# Patient Record
Sex: MALE | Race: WHITE | Employment: FULL TIME | URBAN - METROPOLITAN AREA
[De-identification: names, ages, dates, MRNs, and addresses within clinical notes are randomized per-mention and may not be internally consistent; named-entity substitution may affect disease eponyms.]

---

## 2017-01-23 ENCOUNTER — APPOINTMENT (OUTPATIENT)
Dept: ULTRASOUND IMAGING | Facility: HOSPITAL | Age: 47
End: 2017-01-23
Attending: EMERGENCY MEDICINE
Payer: COMMERCIAL

## 2017-01-23 ENCOUNTER — HOSPITAL ENCOUNTER (EMERGENCY)
Facility: HOSPITAL | Age: 47
Discharge: HOME | End: 2017-01-23
Attending: EMERGENCY MEDICINE
Payer: COMMERCIAL

## 2017-01-23 ENCOUNTER — APPOINTMENT (OUTPATIENT)
Dept: GENERAL RADIOLOGY | Facility: HOSPITAL | Age: 47
End: 2017-01-23
Attending: EMERGENCY MEDICINE
Payer: COMMERCIAL

## 2017-01-23 VITALS
RESPIRATION RATE: 21 BRPM | DIASTOLIC BLOOD PRESSURE: 72 MMHG | BODY MASS INDEX: 45.1 KG/M2 | HEIGHT: 70 IN | SYSTOLIC BLOOD PRESSURE: 130 MMHG | WEIGHT: 315 LBS | OXYGEN SATURATION: 98 % | TEMPERATURE: 98 F | HEART RATE: 60 BPM

## 2017-01-23 DIAGNOSIS — M79.662 PAIN OF LEFT CALF: ICD-10-CM

## 2017-01-23 DIAGNOSIS — S86.912A KNEE STRAIN, LEFT, INITIAL ENCOUNTER: Primary | ICD-10-CM

## 2017-01-23 PROCEDURE — 73560 X-RAY EXAM OF KNEE 1 OR 2: CPT

## 2017-01-23 PROCEDURE — 99284 EMERGENCY DEPT VISIT MOD MDM: CPT

## 2017-01-23 PROCEDURE — 93971 EXTREMITY STUDY: CPT

## 2017-01-23 RX ORDER — IBUPROFEN 600 MG/1
600 TABLET ORAL ONCE
Status: COMPLETED | OUTPATIENT
Start: 2017-01-23 | End: 2017-01-23

## 2017-01-23 NOTE — ED PROVIDER NOTES
Patient Seen in: [facility] Emergency Department    History   Patient presents with:  Lower Extremity Injury (musculoskeletal)    Stated Complaint: left knee injury    HPI    This is a 44-year-old male who arrives here with complaints of left knee i MANAGEMENT       Medications :  Not on File       Family History   Problem Relation Age of Onset   • Hypertension Father    • Cancer Father    • Other Father    • Lipids Mother          Smoking Status: Never Smoker                      Smokeless Status: Ne effusion. The patient was given Motrin for pain I discussed this case with him he says he has an Ace bandage and a knee immobilizer at home and crutches at home.   I discussed with her there is possibly that there is an internal derangement but I discuss

## 2017-01-24 NOTE — ED INITIAL ASSESSMENT (HPI)
Pt was in [facility] on Friday slipped on ice and landed on left knee. Pt states he waited to get back home to seek treatment. Swelling noted to left knee at this time. Pt states the pain is 2/10 when stationary, 4/10 when walking, and 6/10 with bending.

## 2017-02-01 ENCOUNTER — PRIOR ORIGINAL RECORDS (OUTPATIENT)
Dept: OTHER | Age: 47
End: 2017-02-01

## 2017-02-01 ENCOUNTER — MYAURORA ACCOUNT LINK (OUTPATIENT)
Dept: OTHER | Age: 47
End: 2017-02-01

## 2017-02-09 ENCOUNTER — LAB ENCOUNTER (OUTPATIENT)
Dept: LAB | Age: 47
End: 2017-02-09
Attending: INTERNAL MEDICINE
Payer: COMMERCIAL

## 2017-02-09 ENCOUNTER — PRIOR ORIGINAL RECORDS (OUTPATIENT)
Dept: OTHER | Age: 47
End: 2017-02-09

## 2017-02-09 DIAGNOSIS — E66.9 OBESITY, UNSPECIFIED: ICD-10-CM

## 2017-02-09 DIAGNOSIS — I51.7 CARDIOMEGALY: Primary | ICD-10-CM

## 2017-02-09 LAB
BASOPHILS # BLD AUTO: 0.06 X10(3) UL (ref 0–0.1)
BASOPHILS NFR BLD AUTO: 0.6 %
EOSINOPHIL # BLD AUTO: 0.34 X10(3) UL (ref 0–0.3)
EOSINOPHIL NFR BLD AUTO: 3.5 %
ERYTHROCYTE [DISTWIDTH] IN BLOOD BY AUTOMATED COUNT: 13.2 % (ref 11.5–16)
HCT VFR BLD AUTO: 45.2 % (ref 37–53)
HGB BLD-MCNC: 15 G/DL (ref 13–17)
IMMATURE GRANULOCYTE COUNT: 0.03 X10(3) UL (ref 0–1)
IMMATURE GRANULOCYTE RATIO %: 0.3 %
LYMPHOCYTES # BLD AUTO: 3.97 X10(3) UL (ref 0.9–4)
LYMPHOCYTES NFR BLD AUTO: 40.4 %
MCH RBC QN AUTO: 30.4 PG (ref 27–33.2)
MCHC RBC AUTO-ENTMCNC: 33.2 G/DL (ref 31–37)
MCV RBC AUTO: 91.5 FL (ref 80–99)
MONOCYTES # BLD AUTO: 0.85 X10(3) UL (ref 0.1–0.6)
MONOCYTES NFR BLD AUTO: 8.7 %
NEUTROPHIL ABS PRELIM: 4.57 X10 (3) UL (ref 1.3–6.7)
NEUTROPHILS # BLD AUTO: 4.57 X10(3) UL (ref 1.3–6.7)
NEUTROPHILS NFR BLD AUTO: 46.5 %
PLATELET # BLD AUTO: 278 10(3)UL (ref 150–450)
RBC # BLD AUTO: 4.94 X10(6)UL (ref 4.3–5.7)
RED CELL DISTRIBUTION WIDTH-SD: 44.2 FL (ref 35.1–46.3)
WBC # BLD AUTO: 9.8 X10(3) UL (ref 4–13)

## 2017-02-09 PROCEDURE — 36415 COLL VENOUS BLD VENIPUNCTURE: CPT | Performed by: FAMILY MEDICINE

## 2017-02-09 PROCEDURE — 80053 COMPREHEN METABOLIC PANEL: CPT | Performed by: FAMILY MEDICINE

## 2017-02-09 PROCEDURE — 85025 COMPLETE CBC W/AUTO DIFF WBC: CPT

## 2017-02-09 PROCEDURE — 84443 ASSAY THYROID STIM HORMONE: CPT | Performed by: FAMILY MEDICINE

## 2017-02-09 PROCEDURE — 84439 ASSAY OF FREE THYROXINE: CPT | Performed by: FAMILY MEDICINE

## 2017-02-09 PROCEDURE — 84153 ASSAY OF PSA TOTAL: CPT | Performed by: FAMILY MEDICINE

## 2017-02-09 PROCEDURE — 80061 LIPID PANEL: CPT | Performed by: FAMILY MEDICINE

## 2017-02-10 LAB
HEMATOCRIT: 45.2 %
HEMOGLOBIN: 15 G/DL
PLATELETS: 278 K/UL
RED BLOOD COUNT: 4.94 X 10-6/U
WHITE BLOOD COUNT: 9.8 X 10-3/U

## 2017-03-23 ENCOUNTER — MYAURORA ACCOUNT LINK (OUTPATIENT)
Dept: OTHER | Age: 47
End: 2017-03-23

## 2017-04-08 ENCOUNTER — MYAURORA ACCOUNT LINK (OUTPATIENT)
Dept: OTHER | Age: 47
End: 2017-04-08

## 2017-04-08 ENCOUNTER — HOSPITAL ENCOUNTER (OUTPATIENT)
Dept: CV DIAGNOSTICS | Facility: HOSPITAL | Age: 47
Discharge: HOME | End: 2017-04-08
Attending: INTERNAL MEDICINE

## 2017-04-08 DIAGNOSIS — E66.9 OBESITY, UNSPECIFIED: ICD-10-CM

## 2017-04-08 DIAGNOSIS — I51.7 CARDIOMEGALY: ICD-10-CM

## 2017-04-14 ENCOUNTER — PRIOR ORIGINAL RECORDS (OUTPATIENT)
Dept: OTHER | Age: 47
End: 2017-04-14

## 2017-04-19 ENCOUNTER — PRIOR ORIGINAL RECORDS (OUTPATIENT)
Dept: OTHER | Age: 47
End: 2017-04-19

## 2018-02-03 ENCOUNTER — OFFICE VISIT (OUTPATIENT)
Dept: SLEEP CENTER | Facility: HOSPITAL | Age: 48
End: 2018-02-03
Attending: INTERNAL MEDICINE
Payer: COMMERCIAL

## 2018-02-03 PROCEDURE — 95811 POLYSOM 6/>YRS CPAP 4/> PARM: CPT

## 2018-02-10 NOTE — PROCEDURES
[address] [address],[facility] [address]       Accredited by the [facility] ([facility])    PATIENT'S NAME:        Ayanna Hendrix  ATTENDING PHYSICIAN:   Gregory Patel M.D.   REFERRING PHYSICIAN:   ROSALIE Moseley sleep onset time was 3 minutes. During the CPAP portion of the study, the total recording time was 264 minutes, the total sleep time 252 minutes for a sleep efficiency of 95%. Sleep onset latency was 22 minutes. Wake after sleep onset time was 3 minutes. medium-size mask with a humidity level of 4 and an EPR level of 3. Close clinical followup is needed to ensure treatment compliance and remission of daytime impairment. The patient should avoid driving or operating machinery during periods of sleepiness.

## 2018-02-13 ENCOUNTER — PRIOR ORIGINAL RECORDS (OUTPATIENT)
Dept: OTHER | Age: 48
End: 2018-02-13

## 2018-08-23 ENCOUNTER — APPOINTMENT (OUTPATIENT)
Dept: GENERAL RADIOLOGY | Facility: HOSPITAL | Age: 48
End: 2018-08-23
Attending: EMERGENCY MEDICINE
Payer: COMMERCIAL

## 2018-08-23 ENCOUNTER — APPOINTMENT (OUTPATIENT)
Dept: CT IMAGING | Facility: HOSPITAL | Age: 48
End: 2018-08-23
Attending: EMERGENCY MEDICINE
Payer: COMMERCIAL

## 2018-08-23 ENCOUNTER — HOSPITAL ENCOUNTER (EMERGENCY)
Facility: HOSPITAL | Age: 48
Discharge: HOME | End: 2018-08-24
Attending: EMERGENCY MEDICINE
Payer: COMMERCIAL

## 2018-08-23 DIAGNOSIS — R20.2 PARESTHESIAS: Primary | ICD-10-CM

## 2018-08-23 LAB
ALBUMIN SERPL-MCNC: 3.8 G/DL (ref 3.5–4.8)
ALBUMIN/GLOB SERPL: 1.1 {RATIO} (ref 1–2)
ALP LIVER SERPL-CCNC: 79 U/L (ref 45–117)
ALT SERPL-CCNC: 43 U/L (ref 17–63)
ANION GAP SERPL CALC-SCNC: 7 MMOL/L (ref 0–18)
APTT PPP: 31.7 SECONDS (ref 26.1–34.6)
AST SERPL-CCNC: 21 U/L (ref 15–41)
BASOPHILS # BLD AUTO: 0.06 X10(3) UL (ref 0–0.1)
BASOPHILS NFR BLD AUTO: 0.5 %
BILIRUB SERPL-MCNC: 0.6 MG/DL (ref 0.1–2)
BUN BLD-MCNC: 13 MG/DL (ref 8–20)
BUN/CREAT SERPL: 13.3 (ref 10–20)
CALCIUM BLD-MCNC: 8.7 MG/DL (ref 8.3–10.3)
CHLORIDE SERPL-SCNC: 107 MMOL/L (ref 101–111)
CO2 SERPL-SCNC: 28 MMOL/L (ref 22–32)
CREAT BLD-MCNC: 0.98 MG/DL (ref 0.7–1.3)
D-DIMER: <0.27 UG/ML FEU (ref 0–0.49)
EOSINOPHIL # BLD AUTO: 0.59 X10(3) UL (ref 0–0.3)
EOSINOPHIL NFR BLD AUTO: 4.9 %
ERYTHROCYTE [DISTWIDTH] IN BLOOD BY AUTOMATED COUNT: 13.2 % (ref 11.5–16)
GLOBULIN PLAS-MCNC: 3.5 G/DL (ref 2.5–4)
GLUCOSE BLD-MCNC: 121 MG/DL (ref 70–99)
HCT VFR BLD AUTO: 40.2 % (ref 37–53)
HGB BLD-MCNC: 14.3 G/DL (ref 13–17)
IMMATURE GRANULOCYTE COUNT: 0.02 X10(3) UL (ref 0–1)
IMMATURE GRANULOCYTE RATIO %: 0.2 %
INR BLD: 1 (ref 0.9–1.1)
LYMPHOCYTES # BLD AUTO: 5.04 X10(3) UL (ref 0.9–4)
LYMPHOCYTES NFR BLD AUTO: 41.9 %
M PROTEIN MFR SERPL ELPH: 7.3 G/DL (ref 6.1–8.3)
MCH RBC QN AUTO: 31 PG (ref 27–33.2)
MCHC RBC AUTO-ENTMCNC: 35.6 G/DL (ref 31–37)
MCV RBC AUTO: 87.2 FL (ref 80–99)
MONOCYTES # BLD AUTO: 1.06 X10(3) UL (ref 0.1–1)
MONOCYTES NFR BLD AUTO: 8.8 %
NEUTROPHIL ABS PRELIM: 5.26 X10 (3) UL (ref 1.3–6.7)
NEUTROPHILS # BLD AUTO: 5.26 X10(3) UL (ref 1.3–6.7)
NEUTROPHILS NFR BLD AUTO: 43.7 %
OSMOLALITY SERPL CALC.SUM OF ELEC: 295 MOSM/KG (ref 275–295)
PLATELET # BLD AUTO: 274 10(3)UL (ref 150–450)
POTASSIUM SERPL-SCNC: 3.4 MMOL/L (ref 3.6–5.1)
PSA SERPL DL<=0.01 NG/ML-MCNC: 13.6 SECONDS (ref 12.4–14.7)
RBC # BLD AUTO: 4.61 X10(6)UL (ref 4.3–5.7)
RED CELL DISTRIBUTION WIDTH-SD: 41.8 FL (ref 35.1–46.3)
SODIUM SERPL-SCNC: 142 MMOL/L (ref 136–144)
TROPONIN I SERPL-MCNC: <0.046 NG/ML (ref ?–0.05)
WBC # BLD AUTO: 12 X10(3) UL (ref 4–13)

## 2018-08-23 PROCEDURE — 93010 ELECTROCARDIOGRAM REPORT: CPT

## 2018-08-23 PROCEDURE — 85025 COMPLETE CBC W/AUTO DIFF WBC: CPT | Performed by: EMERGENCY MEDICINE

## 2018-08-23 PROCEDURE — 85378 FIBRIN DEGRADE SEMIQUANT: CPT | Performed by: EMERGENCY MEDICINE

## 2018-08-23 PROCEDURE — 96374 THER/PROPH/DIAG INJ IV PUSH: CPT

## 2018-08-23 PROCEDURE — 84484 ASSAY OF TROPONIN QUANT: CPT | Performed by: EMERGENCY MEDICINE

## 2018-08-23 PROCEDURE — 70450 CT HEAD/BRAIN W/O DYE: CPT | Performed by: EMERGENCY MEDICINE

## 2018-08-23 PROCEDURE — 85610 PROTHROMBIN TIME: CPT | Performed by: EMERGENCY MEDICINE

## 2018-08-23 PROCEDURE — 99285 EMERGENCY DEPT VISIT HI MDM: CPT

## 2018-08-23 PROCEDURE — 80053 COMPREHEN METABOLIC PANEL: CPT | Performed by: EMERGENCY MEDICINE

## 2018-08-23 PROCEDURE — 71045 X-RAY EXAM CHEST 1 VIEW: CPT | Performed by: EMERGENCY MEDICINE

## 2018-08-23 PROCEDURE — 85730 THROMBOPLASTIN TIME PARTIAL: CPT | Performed by: EMERGENCY MEDICINE

## 2018-08-23 PROCEDURE — 93005 ELECTROCARDIOGRAM TRACING: CPT

## 2018-08-23 RX ORDER — LEVOTHYROXINE SODIUM 0.05 MG/1
50 TABLET ORAL
COMMUNITY
End: 2022-02-01

## 2018-08-24 ENCOUNTER — APPOINTMENT (OUTPATIENT)
Dept: MRI IMAGING | Facility: HOSPITAL | Age: 48
End: 2018-08-24
Attending: EMERGENCY MEDICINE
Payer: COMMERCIAL

## 2018-08-24 VITALS
DIASTOLIC BLOOD PRESSURE: 85 MMHG | RESPIRATION RATE: 18 BRPM | SYSTOLIC BLOOD PRESSURE: 134 MMHG | HEART RATE: 66 BPM | BODY MASS INDEX: 45.1 KG/M2 | OXYGEN SATURATION: 98 % | HEIGHT: 70 IN | TEMPERATURE: 98 F | WEIGHT: 315 LBS

## 2018-08-24 LAB
ATRIAL RATE: 71 BPM
BILIRUB UR QL STRIP.AUTO: NEGATIVE
CLARITY UR REFRACT.AUTO: CLEAR
COLOR UR AUTO: YELLOW
GLUCOSE UR STRIP.AUTO-MCNC: NEGATIVE MG/DL
KETONES UR STRIP.AUTO-MCNC: NEGATIVE MG/DL
LEUKOCYTE ESTERASE UR QL STRIP.AUTO: NEGATIVE
NITRITE UR QL STRIP.AUTO: NEGATIVE
P AXIS: 60 DEGREES
P-R INTERVAL: 188 MS
PH UR STRIP.AUTO: 6 [PH] (ref 4.5–8)
PROT UR STRIP.AUTO-MCNC: NEGATIVE MG/DL
Q-T INTERVAL: 422 MS
QRS DURATION: 102 MS
QTC CALCULATION (BEZET): 458 MS
R AXIS: 40 DEGREES
RBC UR QL AUTO: NEGATIVE
SP GR UR STRIP.AUTO: 1.02 (ref 1–1.03)
T AXIS: 46 DEGREES
UROBILINOGEN UR STRIP.AUTO-MCNC: <2 MG/DL
VENTRICULAR RATE: 71 BPM

## 2018-08-24 PROCEDURE — 81003 URINALYSIS AUTO W/O SCOPE: CPT | Performed by: EMERGENCY MEDICINE

## 2018-08-24 PROCEDURE — A9576 INJ PROHANCE MULTIPACK: HCPCS | Performed by: EMERGENCY MEDICINE

## 2018-08-24 PROCEDURE — 70553 MRI BRAIN STEM W/O & W/DYE: CPT | Performed by: EMERGENCY MEDICINE

## 2018-08-24 RX ORDER — LORAZEPAM 2 MG/ML
1 INJECTION INTRAMUSCULAR ONCE
Status: COMPLETED | OUTPATIENT
Start: 2018-08-24 | End: 2018-08-24

## 2018-08-24 RX ORDER — POTASSIUM CHLORIDE 20 MEQ/1
40 TABLET, EXTENDED RELEASE ORAL ONCE
Status: COMPLETED | OUTPATIENT
Start: 2018-08-24 | End: 2018-08-24

## 2018-08-24 NOTE — ED INITIAL ASSESSMENT (HPI)
11am started experiencing numbness/tingling in his left arm and later in the day started feeling a dull pain between shoulder blades.  As night as gone on the numbness/tingling radiated to left jaw and down left leg

## 2018-08-24 NOTE — ED PROVIDER NOTES
Patient Seen in: [facility] Emergency Department    History   Patient presents with:  Stroke (neurologic)    Stated Complaint: numbness    HPI    Should not presents with left-sided numbness.   The patient states that the numbness started about 11 AM 5+ YR N/A      Comment: Procedure: LUMBAR EPIDURAL;  Surgeon:                Liz Fragoso MD;  Location: [facility] FOR               PAIN MANAGEMENT        Smoking status: Never Smoker                                                              Smok Lymphocyte Absolute 5.04 (*)     Monocyte Absolute 1.06 (*)     Eosinophil Absolute 0.59 (*)     All other components within normal limits   TROPONIN I - Normal   PROTHROMBIN TIME (PT) - Normal   PTT, ACTIVATED - Normal   D-DIMER - Normal    Narrative: abnormalities. There is nothing specific for acute infarct. There is no hemorrhage or mass lesion. SINUSES:             Mild mid to anterior mucosal thickening of the ethmoid air cells. MASTOIDS:          No sign of acute inflammation.  SKULL: patient could be safely discharged from the ER with close neurology follow-up. The patient actually reported that the numbness had improved somewhat when he got back from MRI.     MDM     The patient's workup has been very reassuring here however the etiol

## 2019-03-01 VITALS
BODY MASS INDEX: 43.52 KG/M2 | SYSTOLIC BLOOD PRESSURE: 138 MMHG | HEART RATE: 73 BPM | WEIGHT: 304 LBS | DIASTOLIC BLOOD PRESSURE: 88 MMHG | HEIGHT: 70 IN

## 2022-05-08 ENCOUNTER — APPOINTMENT (OUTPATIENT)
Dept: GENERAL RADIOLOGY | Facility: HOSPITAL | Age: 52
End: 2022-05-08
Attending: EMERGENCY MEDICINE
Payer: COMMERCIAL

## 2022-05-08 ENCOUNTER — HOSPITAL ENCOUNTER (EMERGENCY)
Facility: HOSPITAL | Age: 52
Discharge: HOME | End: 2022-05-08
Attending: EMERGENCY MEDICINE
Payer: COMMERCIAL

## 2022-05-08 VITALS
BODY MASS INDEX: 45.1 KG/M2 | TEMPERATURE: 98 F | HEART RATE: 67 BPM | SYSTOLIC BLOOD PRESSURE: 171 MMHG | DIASTOLIC BLOOD PRESSURE: 92 MMHG | WEIGHT: 315 LBS | HEIGHT: 70 IN | RESPIRATION RATE: 16 BRPM | OXYGEN SATURATION: 98 %

## 2022-05-08 DIAGNOSIS — M62.830 SPASM OF MUSCLE OF LOWER BACK: ICD-10-CM

## 2022-05-08 DIAGNOSIS — M54.16 LUMBAR RADICULOPATHY: ICD-10-CM

## 2022-05-08 DIAGNOSIS — S20.219A CONTUSION OF CHEST WALL, UNSPECIFIED LATERALITY, INITIAL ENCOUNTER: Primary | ICD-10-CM
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
## 2022-05-08 PROCEDURE — 71046 X-RAY EXAM CHEST 2 VIEWS: CPT | Performed by: EMERGENCY MEDICINE

## 2022-05-08 PROCEDURE — 93005 ELECTROCARDIOGRAM TRACING: CPT

## 2022-05-08 PROCEDURE — 99284 EMERGENCY DEPT VISIT MOD MDM: CPT

## 2022-05-08 PROCEDURE — 93010 ELECTROCARDIOGRAM REPORT: CPT

## 2022-05-08 PROCEDURE — 72110 X-RAY EXAM L-2 SPINE 4/>VWS: CPT | Performed by: EMERGENCY MEDICINE

## 2022-05-08 RX ORDER — CYCLOBENZAPRINE HCL 10 MG
10 TABLET ORAL 3 TIMES DAILY PRN
Qty: 20 TABLET | Refills: 0 | Status: SHIPPED | OUTPATIENT
Start: 2022-05-08 | End: 2022-05-15
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
## 2022-05-09 LAB
ATRIAL RATE: 67 BPM
P AXIS: 58 DEGREES
P-R INTERVAL: 184 MS
Q-T INTERVAL: 422 MS
QRS DURATION: 102 MS
QTC CALCULATION (BEZET): 445 MS
R AXIS: 25 DEGREES
T AXIS: 22 DEGREES
VENTRICULAR RATE: 67 BPM

## 2023-01-21 ENCOUNTER — HOSPITAL ENCOUNTER (EMERGENCY)
Facility: HOSPITAL | Age: 53
Discharge: NURSING, REHAB OR LONG-TERM CARE | End: 2023-01-22
Attending: EMERGENCY MEDICINE
Payer: COMMERCIAL

## 2023-01-21 VITALS
RESPIRATION RATE: 20 BRPM | SYSTOLIC BLOOD PRESSURE: 165 MMHG | HEART RATE: 67 BPM | OXYGEN SATURATION: 97 % | TEMPERATURE: 99 F | DIASTOLIC BLOOD PRESSURE: 99 MMHG

## 2023-01-21 DIAGNOSIS — R45.851 SUICIDAL IDEATION: ICD-10-CM

## 2023-01-21 DIAGNOSIS — F32.A DEPRESSION, UNSPECIFIED DEPRESSION TYPE: Primary | ICD-10-CM

## 2023-01-21 LAB
ALBUMIN SERPL-MCNC: 3.8 G/DL (ref 3.4–5)
ALBUMIN/GLOB SERPL: 1 {RATIO} (ref 1–2)
ALP LIVER SERPL-CCNC: 82 U/L
ALT SERPL-CCNC: 40 U/L
ANION GAP SERPL CALC-SCNC: 6 MMOL/L (ref 0–18)
AST SERPL-CCNC: 20 U/L (ref 15–37)
BILIRUB SERPL-MCNC: 0.5 MG/DL (ref 0.1–2)
BUN BLD-MCNC: 13 MG/DL (ref 7–18)
CALCIUM BLD-MCNC: 8.9 MG/DL (ref 8.5–10.1)
CHLORIDE SERPL-SCNC: 106 MMOL/L (ref 98–112)
CO2 SERPL-SCNC: 28 MMOL/L (ref 21–32)
CREAT BLD-MCNC: 0.94 MG/DL
ETHANOL SERPL-MCNC: <3 MG/DL (ref ?–3)
GFR SERPLBLD BASED ON 1.73 SQ M-ARVRAT: 99 ML/MIN/1.73M2 (ref 60–?)
GLOBULIN PLAS-MCNC: 3.8 G/DL (ref 2.8–4.4)
GLUCOSE BLD-MCNC: 128 MG/DL (ref 70–99)
OSMOLALITY SERPL CALC.SUM OF ELEC: 292 MOSM/KG (ref 275–295)
POTASSIUM SERPL-SCNC: 3.4 MMOL/L (ref 3.5–5.1)
PROT SERPL-MCNC: 7.6 G/DL (ref 6.4–8.2)
SARS-COV-2 RNA RESP QL NAA+PROBE: NOT DETECTED
SODIUM SERPL-SCNC: 140 MMOL/L (ref 136–145)

## 2023-01-21 PROCEDURE — 99285 EMERGENCY DEPT VISIT HI MDM: CPT

## 2023-01-21 PROCEDURE — 80053 COMPREHEN METABOLIC PANEL: CPT | Performed by: EMERGENCY MEDICINE

## 2023-01-21 PROCEDURE — 36415 COLL VENOUS BLD VENIPUNCTURE: CPT

## 2023-01-21 PROCEDURE — 82077 ASSAY SPEC XCP UR&BREATH IA: CPT | Performed by: EMERGENCY MEDICINE

## 2023-01-21 PROCEDURE — 85025 COMPLETE CBC W/AUTO DIFF WBC: CPT | Performed by: EMERGENCY MEDICINE

## 2023-01-22 PROBLEM — F32.2 MDD (MAJOR DEPRESSIVE DISORDER), SINGLE EPISODE, SEVERE (HCC): Status: ACTIVE | Noted: 2023-01-22

## 2023-01-22 LAB
BASOPHILS # BLD AUTO: 0.05 X10(3) UL (ref 0–0.2)
BASOPHILS NFR BLD AUTO: 0.4 %
EOSINOPHIL # BLD AUTO: 0.42 X10(3) UL (ref 0–0.7)
EOSINOPHIL NFR BLD AUTO: 3.5 %
ERYTHROCYTE [DISTWIDTH] IN BLOOD BY AUTOMATED COUNT: 13.1 %
HCT VFR BLD AUTO: 40.4 %
HGB BLD-MCNC: 13.7 G/DL
IMM GRANULOCYTES # BLD AUTO: 0.04 X10(3) UL (ref 0–1)
IMM GRANULOCYTES NFR BLD: 0.3 %
LYMPHOCYTES # BLD AUTO: 5.31 X10(3) UL (ref 1–4)
LYMPHOCYTES NFR BLD AUTO: 44.1 %
MCH RBC QN AUTO: 29.7 PG (ref 26–34)
MCHC RBC AUTO-ENTMCNC: 33.9 G/DL (ref 31–37)
MCV RBC AUTO: 87.6 FL
MONOCYTES # BLD AUTO: 1.11 X10(3) UL (ref 0.1–1)
MONOCYTES NFR BLD AUTO: 9.2 %
NEUTROPHILS # BLD AUTO: 5.11 X10 (3) UL (ref 1.5–7.7)
NEUTROPHILS # BLD AUTO: 5.11 X10(3) UL (ref 1.5–7.7)
NEUTROPHILS NFR BLD AUTO: 42.5 %
PLATELET # BLD AUTO: 280 10(3)UL (ref 150–450)
RBC # BLD AUTO: 4.61 X10(6)UL
WBC # BLD AUTO: 12 X10(3) UL (ref 4–11)

## 2023-01-22 NOTE — BH LEVEL OF CARE ASSESSMENT
Crisis Evaluation Assessment    Malinda Pinto YOB: 1970   Age 50year old MRN [id number]   Location [address] Attending Nelly Arshad, DO      Patient's legal sex: male  Patient identifies as: male  Patient's birth sex: male  Preferred pronouns: He/Him    Date of Service: 1/21/2023    Referral Source:  Referral Source  Referral Source: Community  Referral Source Info: EMS     Reason for Crisis Evaluation   \"Got overwhelmed with stress. \"  Pt reports:  -Mother in law: She is 80, thinks her wife should be permanent caregiver, gets in the way of everything, she lives in [address] [place] [address] Road but talks to wife 8 hours a day, getting worse. Communication with wife did not go well today, son had swim meet today, dr visits arranged when she comes up here, brought mother in law back today.  -3 kids in college, empty nest syndrome              Collateral  N/A-Pt refuses staff from talking to wife. Does not want wife to know he is going inpatient and where he is going. Risk to Self or Others  Psychosis: Pt denies  Aggression/Agitation: Pt denies  Destruction of property: Pt denies  Other behavioral: Pt denies  HI: Pt denies  Pt is not a risk of harm to others. Pt is a risk of harm to self due to SI with method, plan, and intent most recent 1/21/23. Suicide Risk Assessments:    Source of information for CSSR: Patient  In what setting is the screener performed?: in person  1. Have you wished you were dead or wished you could go to sleep and not wake up? (past 30 days): Yes (Pt reports today he felt this way, over past month some passive SI.)  2. Have you actually had any thoughts of killing yourself? (past 30 days): Yes  3. Have you been thinking about how you might kill yourself? (past 30 days): Yes (Pt reports multiple plans)  4. Have you had these thoughts and had some intention of acting on them? (past 30 days): Yes  5a.  Have you started to work out or worked out the details of how to kill yourself? (past 30 days): Yes (Pt left information for wife to have access to his stuff if he completed suicide today)  5b. Do you intend to carry out this plan? (past 30 days): Yes  6. Have you ever done anything, started to do anything, or prepared to do anything to end your life? (lifetime): No  7. How long ago did you do any of these?: Within the last three months  Score -  OV: 10 - High Risk   Describe : Pt reports left wallet, phone, bank info for wife today. Wife found pt walking around outside. Pt reports he wanted to commit with multiple plans in mind. Pt reports passive SI leading up to today. Is your experience of thoughts of dying by suicide: A Coping Strategy; A Solution to a Problem  Protective Factors: Wife  Past Suicidal Ideation: Denies            Family History or Personal Lived Experience of Loss or Near Loss by Suicide: Denies          Hopeless/Worthless/Helplessness: Pt reports feeling helpless that can't help wife. Significant losses: Father in law passed away 7 years ago. The dependency from mother-in-law has increased. Stressors: Mother-in-law staying with them for awhile now, but she manipulates facts in order to have pt wife take care of her. Pt reports multiple plans to RN, reporting he wanted to jump off a parking garage in [facility], or drown himself in a pond by his house, or have the police shoot him. Pt reported to RN, \"Should I go on? \" when discussing other plans in mind. Pt reports wife was unaware of him feeling this way and does not want to discuss his mental health treatment with her currently. Non-Suicidal Self-Injury:   Pt denies            Access to Means:  Access to Means  Has access to means to attempt suicide or harm others or property: No  Access to Firearm/Weapon: No  Do you have a firearm owner ID card?: No    Protective Factors:   Protective Factors:  Wife    Review of Psychiatric Systems:  Depression: Pt denies hx, denies current. Pt reports feeling angry and overwhelmed. Pt reports depression can be a side effect of a medication he takes. Pt reports he began taking medication June 2022. Pt denies feeling any depression. Due to pt's reasoning for assessment and reason for being brought into ED today, it is clear to say pt is actually depressed but minimizing at the moment. Anxiety: Pt denies. Anger: Pt reports just feeling angry right now about situation with mother-in-law and conversation with wife today. Sleep: Pt reports not good sleep. Pt reports he averages about 3-5 hours a night. Pt reports no difficulty falling asleep, but reports frequent awakening. Appetite: Pt bad appetite, depending on how he feels. Pt reports \"today was a very high sugar day\". Pt reports he typically eats about 3 meals when with wife. Pt reports when he is not wife he eats about 1 meal a day. Pt reports he is only not with wife when working and does not have time to eat at work and does not eat until he returns home. Substance Use:  Pt denies              Functional Achievement:   Work: Pt reports he works full-time, reporting no issues with performance and attendance. Home: Pt able to perform ADL's and maintain self. Current Treatment and Treatment History:  Prior diagnoses:  -Pt denies    Inpatient hx:  -Pt denies    Outpatient hx:  -Pt denies    Therapist:  -Pt denies    Psychiatrist:  -Pt denies    Medications:  -Pt denies            Relevant Social History:  Family hx: Mother was dx Alzheimer's. Trauma hx: Pt denies  Marital status:   Children: Pt reports he has 3 adult children  Legal hx: Pt denies  Living situation: Lives with wife  Supports:  Wife            You and Complex (as applicable):                                    EDP Assessment (as applicable):                                                                       Abuse Assessment:  Abuse Assessment  Physical Abuse: Denies  Verbal Abuse: Denies  Sexual Abuse: Denies  Neglect: Denies  Does anyone say or do something to you that makes you feel unsafe?: No  Have You Ever Been Harmed by a Partner/Caregiver?: No  Health Concerns r/t Abuse: No  Possible Abuse Reportable to[de-identified] Not appropriate for reporting to authorities    Mental Status Exam:   General Appearance  Characteristics: Appropriate clothing;Good hygiene  Eye Contact: Direct  Psychomotor Behavior  Gait/Movement: Coordinated;Normal;Steady  Abnormal movements: None  Posture: Relaxed  Rate of Movement: Normal  Mood and Affect  Mood or Feelings: Irritability; Anxious; Hopeless;Depressed  Anxiety Level- JARAD only: Moderate  Appropriateness of Affect: Congruent to mood; Appropriate to situation  Range of Affect: Normal  Stability of Affect: Stable  Attitude toward staff: Co-operative;Open  Speech  Rate of Speech: Appropriate  Flow of Speech: Appropriate  Intensity of Volume: Ordinary  Clarity: Clear  Cognition  Concentration: Unimpaired  Memory: Recent memory intact; Remote memory intact  Orientation Level: Oriented X4;Appropriate for developmental age  Insight: Poor  Fair/poor insight as evidenced by: Pt today had multiple plans for SI, left house and went missing for a few hours  Judgment: Poor  Fair/poor judgment as evidenced by: Pt reports SI with plan, methods, and intent  Thought Patterns  Clarity/Relevance: Coherent;Logical;Relevant to topic  Flow: Organized  Content: Ordinary  Level of Consciousness: Alert  Level of Consciousness: Alert  Behavior  Exhibited behavior: Appropriate to situation;Participated      Disposition:   01/22/23 0126   Level of Care Recommendations   Consulted with Dr. Ashli Dawn   Level of Care Recommendation Inpatient Acute Care   Unit A1   Reason for Unit Assigned Patient presents suicidal with multiple methods, current plan and intent 1/21/23.    Inpatient Criteria Suicidal/homicidal risk   Behavioral Precautions S   Medical Precautions None   Refused Treatment No   Education Provided Call 911 in an Emergency; Advised to call if condition worsens; Advised to call with [facility] Crisis Line Number   Transferred N   Sign-In   Paperwork Signed Patient Rights;Voluntary Admission Form   Inpatient Admission Type Adult Voluntary Signed   Patient Provided Rights of Individuals Receiving MH/DD Services   Patient Verbalized Understanding Yes       Assessment Summary:   Pt is a 46-year-old male requiring crisis evaluation due to feeling suicidal, with multiple reported methods, most recent plan being 1/21/23, and intent. Pt denies HI/AVH/SIB. Pt report he has been having passive suicidal thoughts for awhile now but today he was overwhelmed and angry with how a discussion with his wife went. Pt reports his main trigger is his mother-in-law, reporting she is narcissistic and manipulative and takes up the majority of his wife's day and time, leaving them to feel distant. Pt reports he wants to help his wife with her feelings of being overwhelmed but does not know how. Pt reports his methods would be, \"jumping off of a parking garage in [facility], drowning self in a pond, having the police shoot me, should I go on?\". Pt minimizes feeling depressed or anxious, reporting he does not feel either but does agree to feeling angry with mother-in-law, as he reports she is the primary reason for the way he and his wife feel when it comes to stress. Pt reports he left his bank information, his wallet, and his phone at home in the table so his wife would have access to his accounts had he actually been successful in committing suicide. Pt reports no prior psychiatric history or involvement and denies current psychiatric providers. Pt reports poor sleep and eating habits, reporting 3-5 hours of sleep with frequent awakening and 1-3 meals daily. Pt denies family hx of mental illness aside from alzheimer's. Pt C-SSRS score is High Risk. Pt denies prior attempts.   Pt visibly upset about going inpatient. Pt refuses to disclose information to wife and reports not wanting staff to speak to wife other than for her to report to his job that he will be out of work for a few days. Pt is a risk of harm to self and meets requirements for inpatient psychiatric hospitalization. Pt requires inpatient psychiatric hospitalization for stabilization, support, ans safety. Risk/Protective Factors  Protective Factors: Wife    Level of Care Recommendations  Consulted with: Dr. Farideh Newton  Level of Care Recommendation: Inpatient Acute Care  Unit: Adult  Reason for Unit Assigned: Patient presents suicidal with multiple methods, current plan and intent 1/21/23. Inpatient Criteria: Suicidal/homicidal risk  Behavioral Precautions: Suicide  Medical Precautions: None  Refused Treatment: No  Education Provided: Call 911 in an Emergency; Advised to call if condition worsens; Advised to call with [facility] Crisis Line Number  Transferred: No  Sign-In  Paperwork Signed: Patient Rights;Voluntary Admission Form  Inpatient Admission Type: Adult Voluntary Signed  Patient Provided: Rights of Individuals Receiving MH/DD Services  Patient Verbalized Understanding: Yes        Diagnoses:  Primary Psychiatric Diagnosis  F32.3 Major Depressive Disorder, Single Episode, Severe w/o psychotic features.      Secondary Psychiatric Diagnoses  F41.1 Generalized Anxiety Disorder    Pervasive Diagnoses  Deferred  Pertinent Non-Psychiatric Diagnoses  Deferred        Yolanda ARAUJO

## 2023-01-22 NOTE — ED QUICK NOTES
Pt stated \"I was going to jump off a parking garage in [facility], drown myself in the pond by my house or jump a  so they would have to shoot me. Should I go on? \" pt then stated his wife is unaware of his thoughts or plans.

## 2023-01-22 NOTE — ED INITIAL ASSESSMENT (HPI)
Pt arrived to ED via EMS with suicidal ideation. Per EMS pt left his bank account information at home on counter and was missing for several hours. Pt showed up at home and family called EMS, pt stated he has been having thoughts of hurting himself by drowning himself in the pond by his house.  Pt is calm and cooperative upon arrival.

## 2023-01-22 NOTE — PROGRESS NOTES
01/22/23 0011   Violence Aggression Assessment Checklist   Behaviors Exhibited By: Patient   VAAC - Patient   History of Violence 0   Uncooperative 0   Verbal Abuse 0   Hostile/Attacking Objects 0   Threats 0   Assaultive/Combative 0   VAAC Risk Score 0   VAAC Level of Risk Low Risk

## 2023-01-22 NOTE — ED NOTES
Patient reported to this writer that he does not want his wife to know where he is going. Patient reports he does not want any staff to disclose the findings from today's visit, does not want any staff to disclose to wife that he is transferring to a different hospital, does not want any staff to disclose what hospital he is going to, and does not want to see wife currently in person. Patient reports the only allowance he will give any staff member, in regards to communication with his wife, would be to inform her that she will need to inform his employment about his upcoming absence from work.

## 2023-01-22 NOTE — PROGRESS NOTES
01/22/23 0031   Note Sharing   Has the patient requested to hide [facility] Notes from [address]?  Yes

## 2023-01-22 NOTE — ED QUICK NOTES
Spoke with wife who is in waiting room. She is very tearful and unaware of husbands feelings. States he is a \"good man\" and is [facility]" she requested to be called with any updates on pt condition or transfer.

## 2024-03-22 ENCOUNTER — OFFICE VISIT (OUTPATIENT)
Dept: FAMILY MEDICINE CLINIC | Facility: CLINIC | Age: 54
End: 2024-03-22
Payer: COMMERCIAL

## 2024-03-22 VITALS
OXYGEN SATURATION: 97 % | HEART RATE: 77 BPM | RESPIRATION RATE: 16 BRPM | HEIGHT: 70 IN | WEIGHT: 315 LBS | BODY MASS INDEX: 45.1 KG/M2 | TEMPERATURE: 98 F | SYSTOLIC BLOOD PRESSURE: 140 MMHG | DIASTOLIC BLOOD PRESSURE: 88 MMHG

## 2024-03-22 DIAGNOSIS — F32.2 MDD (MAJOR DEPRESSIVE DISORDER), SINGLE EPISODE, SEVERE (HCC): ICD-10-CM

## 2024-03-22 DIAGNOSIS — I10 HYPERTENSION, UNSPECIFIED TYPE: ICD-10-CM

## 2024-03-22 DIAGNOSIS — E66.01 MORBID OBESITY WITH BMI OF 40.0-44.9, ADULT (HCC): ICD-10-CM

## 2024-03-22 DIAGNOSIS — E11.9 NEW ONSET TYPE 2 DIABETES MELLITUS (HCC): Primary | ICD-10-CM

## 2024-03-22 PROCEDURE — 3077F SYST BP >= 140 MM HG: CPT | Performed by: FAMILY MEDICINE

## 2024-03-22 PROCEDURE — 99214 OFFICE O/P EST MOD 30 MIN: CPT | Performed by: FAMILY MEDICINE

## 2024-03-22 PROCEDURE — 3079F DIAST BP 80-89 MM HG: CPT | Performed by: FAMILY MEDICINE

## 2024-03-22 PROCEDURE — 3008F BODY MASS INDEX DOCD: CPT | Performed by: FAMILY MEDICINE

## 2024-03-22 RX ORDER — AMLODIPINE BESYLATE 5 MG/1
5 TABLET ORAL DAILY
Qty: 90 TABLET | Refills: 3 | Status: CANCELLED | OUTPATIENT
Start: 2024-03-22

## 2024-03-22 RX ORDER — AMLODIPINE BESYLATE 5 MG/1
5 TABLET ORAL DAILY
Qty: 90 TABLET | Refills: 3 | Status: SHIPPED | OUTPATIENT
Start: 2024-03-22

## 2024-03-22 RX ORDER — LOSARTAN POTASSIUM AND HYDROCHLOROTHIAZIDE 12.5; 5 MG/1; MG/1
1 TABLET ORAL DAILY
Qty: 90 TABLET | Refills: 3 | Status: SHIPPED | OUTPATIENT
Start: 2024-03-22 | End: 2025-03-17

## 2024-03-22 RX ORDER — CYCLOBENZAPRINE HCL 10 MG
10 TABLET ORAL NIGHTLY PRN
Qty: 30 TABLET | Refills: 5 | Status: SHIPPED | OUTPATIENT
Start: 2024-03-22

## 2024-03-22 NOTE — PROGRESS NOTES
Stephen Junior is a 53 year old male.   Chief Complaint   Patient presents with    Physical     HPI:      The patient presents to the office for concerns of follow-up for chronic medical conditions.  Patient has a history of obesity diabetes hypertension and depression.    Patient states that he is ran out of his Ozempic and needs a refill states he is taking his metformin is not checking his sugars on a regular basis.  Last time he took his Ozempic was 2 weeks ago.  Patient is awaiting insurance to kick in for his Ozempic.  Patient will be due to see an eye doctor as well..    Patient's by blood pressure has been well-controlled in the past today slightly elevated he denies any chest pain shortness of breath or any palpitations..    Patient states that his mental health has been well his depression well-controlled currently on no medications and does not feel like it is an issue at this point.    Past Medical History:   Diagnosis Date    PITA (obstructive sleep apnea) 02/03/18 Split Night    AHI 65 Supine  non-supine AHI 30    Psoriasis      Past Surgical History:   Procedure Laterality Date    FLUOR GID & LOCLZJ NDL/CATH SPI DX/THER NJX N/A 6/9/2015    Procedure: LUMBAR EPIDURAL;  Surgeon: Constantine Bloom MD;  Location: [facility]    FLUOR GID & LOCLZJ NDL/CATH SPI DX/THER NJX N/A 6/23/2015    Procedure: LUMBAR EPIDURAL;  Surgeon: Constantine Bloom MD;  Location: [facility]    INJECTION, W/WO CONTRAST, DX/THERAPEUTIC SUBSTANCE, EPIDURAL/SUBARACHNOID; LUMBAR/SACRAL N/A 6/9/2015    Procedure: LUMBAR EPIDURAL;  Surgeon: Constantine Bloom MD;  Location: [facility]    INJECTION, W/WO CONTRAST, DX/THERAPEUTIC SUBSTANCE, EPIDURAL/SUBARACHNOID; LUMBAR/SACRAL N/A 6/23/2015    Procedure: LUMBAR EPIDURAL;  Surgeon: Constantine Bloom MD;  Location: [facility]    M-SEDAJ BY  PHYS PERFRMG SVC 5+ YR N/A 6/9/2015    Procedure: LUMBAR  EPIDURAL;  Surgeon: Constantine Bloom MD;  Location: [facility]     Family History   Problem Relation Age of Onset    Hypertension Father     Cancer Father         bladder    Other (Other) Father     Other (Other) Mother     Lipids Mother      Social History:  Social History     Socioeconomic History    Marital status:    Tobacco Use    Smoking status: Never     Passive exposure: Past    Smokeless tobacco: Never   Vaping Use    Vaping Use: Never used   Substance and Sexual Activity    Alcohol use: Yes     Alcohol/week: 0.0 standard drinks of alcohol    Drug use: Never   Social History Narrative    ** Merged History Encounter **          Allergies:  Allergies   Allergen Reactions    Otezla [Apremilast] OTHER (SEE COMMENTS)     Depression     Latex RASH     Depends on time exposure      Current Meds:  Current Outpatient Medications   Medication Sig Dispense Refill    halobetasol 0.05 % External Cream Apply topically 2 (two) times daily.      losartan-hydroCHLOROthiazide 50-12.5 MG Oral Tab Take 1 tablet by mouth daily. 90 tablet 3    metFORMIN 500 MG Oral Tab Take 1 tablet (500 mg total) by mouth daily. 30 tablet 12    semaglutide 4 MG/3ML Subcutaneous Solution Pen-injector Inject 1 mg into the skin once a week. 1 each 12    amLODIPine 5 MG Oral Tab Take 1 tablet (5 mg total) by mouth daily. 90 tablet 3    cyclobenzaprine 10 MG Oral Tab Take 1 tablet (10 mg total) by mouth nightly as needed for Muscle spasms. 30 tablet 5    amLODIPine 5 MG Oral Tab       Tapinarof (VTAMA) 1 % External Cream Apply 1 Application. topically daily. To the psoriasis 60 g 2    Econazole Nitrate 1 % External Cream Apply to abdomen twice daily for 3 weeks 85 g 3        REVIEW OF SYSTEMS:   GENERAL HEALTH: no fever, no appetite change, no weight change  HEENT: no vision changes, no ear symptoms, no hearing changes, no eye symptoms, no nasal symptoms  RESPIRATORY: no cough, no wheezing, no dyspnea, no orthopnea    CARDIOVASCULAR: no chest pain, no chest pressure, no palpitations, no claudication, no edema, no cold extremities, no dyspnea on exertion, no diaphoresis   GI: no nausea, no vomiting, no juandice, no stool changes, no abdominal pain  : no incontinence, no urine changes, no urinary symptoms, no bladder pain  SKIN: no rash, no suspicious mole, no skin lesions,   NEURO: no motor function change, no sensory change, no cognitive changes, no memory changes, no seizures, no headache, no depression and no anxiety  MS: no musculoskeletal symptoms, no joint pain, no joint stiffness, no joint swelling  Lymphatic:  no cervical lymph node enlargement, no inguinal lymph node enlargement  Endocrine: no cold intolerance, no heat intolerance , no polydipsia ,no polyuria    EXAM:   /88   Pulse 77   Temp 97.9 °F (36.6 °C)   Resp 16   Ht 5' 10\" (1.778 m)   Wt (!) 347 lb 3.2 oz (157.5 kg)   SpO2 97%   BMI 49.82 kg/m²     GENERAL: healthy appearing, well developed, well nourished,in no apparent distress, A&Ox3  Head:  atraumatic, normocephalic  Eyes: PERRLA,EOMI, conjunctiva appeared normal, eyelids appeared normal  ENT: Oropharynx unremarkable   Neck: supple, no thyroid masses, no cervical adenopathy   Respiratory/Chest: CTAB, no wheezing,no rales  CARDIO: RRR , S1,S2 normal, S3 abscent,no murmur  Vascular: no edema  GI/Abdomen: soft, nontender, no mass, BS present  SKIN: warm, no rash  EXTREMITIES.MS: no cyanosis, clubbing or edema, Full ROM of all extremities, no joint swelling or tenderness  Neuro/Psych: MS 5/5 throughout, sensation intact, CN 2-12 grossly intact, DTR were 2+ and symmetric.     ASSESSMENT/ PLAN:     1. New onset type 2 diabetes mellitus (HCC)  Will need to revisit labs to assess diabetic control.  Last time he was checked was in December and seen hemoglobin A1c was 6.4.  Will send a prescription for his Ozempic to get it approved in the meantime we will give him samples.  Referral for the eye doctor  was placed will need to check his A1c CMP TSH as well as his microalbumin and creatinine ratio.  Depending on control will determine if we need to follow him in 3 or 6 months.  - Comp Metabolic Panel (14) [E]; Future  - Ophthalmology Referral - In Network  - Hemoglobin A1C [E]; Future  - TSH and Free T4 [E]; Future  - Microalb/Creat Ratio, Random Urine [E]; Future    2. MDD (major depressive disorder), single episode, severe (HCC)  Stable at goal well-controlled no further interventions at this point..  - Comp Metabolic Panel (14) [E]; Future    3. Morbid obesity with BMI of 40.0-44.9, adult (HCC)  Ongoing struggle for the patient has a BMI of almost 50.  We are hoping that diabetes control with his GLP-1 will achieve better BMI results.  Patient might need to see weight management depending on how his weight behaves with his GLP-1 regiment.  - Comp Metabolic Panel (14) [E]; Future    4. Hypertension, unspecified type  Blood pressure little bit elevated today on recheck as well we will make a slight Moditen modification to his blood pressure meds will continue with amlodipine and will combine losartan with hydrochlorothiazide, hydrochlorothiazide being a new medication and see if he can achieve better blood pressure control.  Patient to check his blood pressure minimally once a week and write them down let us know.  I have to touch base again in 1 to 3 months depending on his control.  - Comp Metabolic Panel (14) [E]; Future      No follow-ups on file.    The patient is to return to office in 3-6m  The patient is to return to office for persistent or worsening signs and symptoms.prn   The proper use of medication and possible side effects discussed with patient.  An AVS was given to patient.  The patient verbalized understanding, agrees to treatment regimen and all questions were answered.

## 2024-04-22 ENCOUNTER — TELEPHONE (OUTPATIENT)
Dept: FAMILY MEDICINE CLINIC | Facility: CLINIC | Age: 54
End: 2024-04-22

## 2024-04-22 NOTE — TELEPHONE ENCOUNTER
Lab Frequency Next Occurrence   Comp Metabolic Panel (14) [E] Once 03/22/2024   Hemoglobin A1C [E] Once 03/22/2024   TSH and Free T4 [E] Once 03/22/2024   Microalb/Creat Ratio, Random Urine [E] Once 03/22/2024     Letter mailed to patient reminding them they have outstanding orders.

## 2024-06-03 ENCOUNTER — LAB ENCOUNTER (OUTPATIENT)
Dept: LAB | Age: 54
End: 2024-06-03
Attending: FAMILY MEDICINE
Payer: COMMERCIAL

## 2024-06-03 DIAGNOSIS — F32.2 MDD (MAJOR DEPRESSIVE DISORDER), SINGLE EPISODE, SEVERE (HCC): ICD-10-CM

## 2024-06-03 DIAGNOSIS — E11.9 NEW ONSET TYPE 2 DIABETES MELLITUS (HCC): ICD-10-CM

## 2024-06-03 DIAGNOSIS — E66.01 MORBID OBESITY WITH BMI OF 40.0-44.9, ADULT (HCC): ICD-10-CM

## 2024-06-03 DIAGNOSIS — I10 HYPERTENSION, UNSPECIFIED TYPE: ICD-10-CM

## 2024-06-03 LAB
ALBUMIN SERPL-MCNC: 3.8 G/DL (ref 3.4–5)
ALBUMIN/GLOB SERPL: 0.9 {RATIO} (ref 1–2)
ALP LIVER SERPL-CCNC: 73 U/L
ALT SERPL-CCNC: 52 U/L
ANION GAP SERPL CALC-SCNC: 7 MMOL/L (ref 0–18)
AST SERPL-CCNC: 28 U/L (ref 15–37)
BILIRUB SERPL-MCNC: 0.8 MG/DL (ref 0.1–2)
BUN BLD-MCNC: 14 MG/DL (ref 9–23)
CALCIUM BLD-MCNC: 9.2 MG/DL (ref 8.5–10.1)
CHLORIDE SERPL-SCNC: 106 MMOL/L (ref 98–112)
CO2 SERPL-SCNC: 27 MMOL/L (ref 21–32)
CREAT BLD-MCNC: 0.95 MG/DL
CREAT UR-SCNC: 146 MG/DL
EGFRCR SERPLBLD CKD-EPI 2021: 96 ML/MIN/1.73M2 (ref 60–?)
EST. AVERAGE GLUCOSE BLD GHB EST-MCNC: 131 MG/DL (ref 68–126)
FASTING STATUS PATIENT QL REPORTED: NO
GLOBULIN PLAS-MCNC: 4.2 G/DL (ref 2.8–4.4)
GLUCOSE BLD-MCNC: 127 MG/DL (ref 70–99)
HBA1C MFR BLD: 6.2 % (ref ?–5.7)
MICROALBUMIN UR-MCNC: 0.58 MG/DL
MICROALBUMIN/CREAT 24H UR-RTO: 4 UG/MG (ref ?–30)
OSMOLALITY SERPL CALC.SUM OF ELEC: 292 MOSM/KG (ref 275–295)
POTASSIUM SERPL-SCNC: 4.1 MMOL/L (ref 3.5–5.1)
PROT SERPL-MCNC: 8 G/DL (ref 6.4–8.2)
SODIUM SERPL-SCNC: 140 MMOL/L (ref 136–145)
T4 FREE SERPL-MCNC: 1 NG/DL (ref 0.8–1.7)
TSI SER-ACNC: 3.12 MIU/ML (ref 0.36–3.74)

## 2024-06-03 PROCEDURE — 84443 ASSAY THYROID STIM HORMONE: CPT | Performed by: FAMILY MEDICINE

## 2024-06-03 PROCEDURE — 82570 ASSAY OF URINE CREATININE: CPT | Performed by: FAMILY MEDICINE

## 2024-06-03 PROCEDURE — 84439 ASSAY OF FREE THYROXINE: CPT | Performed by: FAMILY MEDICINE

## 2024-06-03 PROCEDURE — 82043 UR ALBUMIN QUANTITATIVE: CPT | Performed by: FAMILY MEDICINE

## 2024-06-03 PROCEDURE — 83036 HEMOGLOBIN GLYCOSYLATED A1C: CPT | Performed by: FAMILY MEDICINE

## 2024-06-03 PROCEDURE — 80053 COMPREHEN METABOLIC PANEL: CPT | Performed by: FAMILY MEDICINE

## 2024-06-05 ENCOUNTER — TELEPHONE (OUTPATIENT)
Dept: FAMILY MEDICINE CLINIC | Facility: CLINIC | Age: 54
End: 2024-06-05

## 2024-06-05 NOTE — TELEPHONE ENCOUNTER
Left detailed message to voicemail (per verbal release form consent with confirmed identifying message) of Dr. Boogie's note below. Patient was advised to call office back with any questions/concerns.    Future Appointments   Date Time Provider Department Center   6/6/2024  4:45 PM Christofer Clark MD [facility] [place]     Recall placed - dm follow-up in 3 months

## 2024-06-05 NOTE — TELEPHONE ENCOUNTER
----- Message from Christofer Boyle sent at 6/5/2024  8:12 AM CDT -----  Results reviewed. Please inform the patient that his diabetes is well controlled, keep up the good work and follow up in 3 months

## 2024-06-06 ENCOUNTER — OFFICE VISIT (OUTPATIENT)
Dept: FAMILY MEDICINE CLINIC | Facility: CLINIC | Age: 54
End: 2024-06-06
Payer: COMMERCIAL

## 2024-06-06 ENCOUNTER — HOSPITAL ENCOUNTER (OUTPATIENT)
Dept: CT IMAGING | Age: 54
Discharge: HOME | End: 2024-06-06
Attending: FAMILY MEDICINE
Payer: COMMERCIAL

## 2024-06-06 VITALS
TEMPERATURE: 97 F | SYSTOLIC BLOOD PRESSURE: 128 MMHG | BODY MASS INDEX: 47 KG/M2 | HEART RATE: 73 BPM | WEIGHT: 315 LBS | DIASTOLIC BLOOD PRESSURE: 82 MMHG | OXYGEN SATURATION: 96 %

## 2024-06-06 DIAGNOSIS — R10.31 RLQ ABDOMINAL PAIN: Primary | ICD-10-CM

## 2024-06-06 DIAGNOSIS — R10.31 RLQ ABDOMINAL PAIN: ICD-10-CM

## 2024-06-06 LAB
ALBUMIN SERPL-MCNC: 4 G/DL (ref 3.4–5)
ALBUMIN/GLOB SERPL: 0.9 {RATIO} (ref 1–2)
ALP LIVER SERPL-CCNC: 65 U/L
ALT SERPL-CCNC: 64 U/L
ANION GAP SERPL CALC-SCNC: 8 MMOL/L (ref 0–18)
AST SERPL-CCNC: 37 U/L (ref 15–37)
BASOPHILS # BLD AUTO: 0.06 X10(3) UL (ref 0–0.2)
BASOPHILS NFR BLD AUTO: 0.5 %
BILIRUB SERPL-MCNC: 1 MG/DL (ref 0.1–2)
BUN BLD-MCNC: 12 MG/DL (ref 9–23)
CALCIUM BLD-MCNC: 9.3 MG/DL (ref 8.5–10.1)
CHLORIDE SERPL-SCNC: 102 MMOL/L (ref 98–112)
CO2 SERPL-SCNC: 28 MMOL/L (ref 21–32)
CREAT BLD-MCNC: 1.11 MG/DL
EGFRCR SERPLBLD CKD-EPI 2021: 80 ML/MIN/1.73M2 (ref 60–?)
EOSINOPHIL # BLD AUTO: 0.29 X10(3) UL (ref 0–0.7)
EOSINOPHIL NFR BLD AUTO: 2.5 %
ERYTHROCYTE [DISTWIDTH] IN BLOOD BY AUTOMATED COUNT: 13.7 %
FASTING STATUS PATIENT QL REPORTED: NO
GLOBULIN PLAS-MCNC: 4.3 G/DL (ref 2.8–4.4)
GLUCOSE BLD-MCNC: 115 MG/DL (ref 70–99)
HCT VFR BLD AUTO: 44.9 %
HGB BLD-MCNC: 14.6 G/DL
IMM GRANULOCYTES # BLD AUTO: 0.04 X10(3) UL (ref 0–1)
IMM GRANULOCYTES NFR BLD: 0.3 %
LYMPHOCYTES # BLD AUTO: 4.94 X10(3) UL (ref 1–4)
LYMPHOCYTES NFR BLD AUTO: 42.5 %
MCH RBC QN AUTO: 29.6 PG (ref 26–34)
MCHC RBC AUTO-ENTMCNC: 32.5 G/DL (ref 31–37)
MCV RBC AUTO: 91.1 FL
MONOCYTES # BLD AUTO: 1.02 X10(3) UL (ref 0.1–1)
MONOCYTES NFR BLD AUTO: 8.8 %
NEUTROPHILS # BLD AUTO: 5.28 X10 (3) UL (ref 1.5–7.7)
NEUTROPHILS # BLD AUTO: 5.28 X10(3) UL (ref 1.5–7.7)
NEUTROPHILS NFR BLD AUTO: 45.4 %
OSMOLALITY SERPL CALC.SUM OF ELEC: 287 MOSM/KG (ref 275–295)
PLATELET # BLD AUTO: 299 10(3)UL (ref 150–450)
POTASSIUM SERPL-SCNC: 3.5 MMOL/L (ref 3.5–5.1)
PROT SERPL-MCNC: 8.3 G/DL (ref 6.4–8.2)
RBC # BLD AUTO: 4.93 X10(6)UL
SODIUM SERPL-SCNC: 138 MMOL/L (ref 136–145)
WBC # BLD AUTO: 11.6 X10(3) UL (ref 4–11)

## 2024-06-06 PROCEDURE — 3044F HG A1C LEVEL LT 7.0%: CPT | Performed by: FAMILY MEDICINE

## 2024-06-06 PROCEDURE — 74176 CT ABD & PELVIS W/O CONTRAST: CPT | Performed by: FAMILY MEDICINE

## 2024-06-06 PROCEDURE — 3061F NEG MICROALBUMINURIA REV: CPT | Performed by: FAMILY MEDICINE

## 2024-06-06 PROCEDURE — 3074F SYST BP LT 130 MM HG: CPT | Performed by: FAMILY MEDICINE

## 2024-06-06 PROCEDURE — 3079F DIAST BP 80-89 MM HG: CPT | Performed by: FAMILY MEDICINE

## 2024-06-06 PROCEDURE — 99214 OFFICE O/P EST MOD 30 MIN: CPT | Performed by: FAMILY MEDICINE

## 2024-06-06 PROCEDURE — 80053 COMPREHEN METABOLIC PANEL: CPT | Performed by: FAMILY MEDICINE

## 2024-06-06 PROCEDURE — 85025 COMPLETE CBC W/AUTO DIFF WBC: CPT | Performed by: FAMILY MEDICINE

## 2024-06-06 NOTE — PROGRESS NOTES
Stephen Junior is a 54 year old male.   Chief Complaint   Patient presents with    Pain     On right side right under ribs.      HPI:    55-year-old male with a history of diabetes mellitus and psoriasis with shows up with right-sided abdominal pain has been going on for days.  Patient states that the pain is on his right side of his ribs radiates towards his back and towards his pelvis.  Patient states that is worse after eating the only 2 way he gets any kind of relief is by having bowel movements which have recently been looser towards diarrhea and greenish in color.  Patient states that has some nausea associated with it has had some chills.  Past Medical History:    PITA (obstructive sleep apnea)    AHI 65 Supine  non-supine AHI 30    Psoriasis     Past Surgical History:   Procedure Laterality Date    Fluor gid & loclzj ndl/cath spi dx/ther njx N/A 6/9/2015    Procedure: LUMBAR EPIDURAL;  Surgeon: Constantine Bloom MD;  Location: [facility]    Fluor gid & loclzj ndl/cath spi dx/ther njx N/A 6/23/2015    Procedure: LUMBAR EPIDURAL;  Surgeon: Constantine Bloom MD;  Location: [facility]    Injection, w/wo contrast, dx/therapeutic substance, epidural/subarachnoid; lumbar/sacral N/A 6/9/2015    Procedure: LUMBAR EPIDURAL;  Surgeon: Constantine Bloom MD;  Location: [facility]    Injection, w/wo contrast, dx/therapeutic substance, epidural/subarachnoid; lumbar/sacral N/A 6/23/2015    Procedure: LUMBAR EPIDURAL;  Surgeon: Constantine Bloom MD;  Location: [facility]    M-sedaj by  phys perfrmg svc 5+ yr N/A 6/9/2015    Procedure: LUMBAR EPIDURAL;  Surgeon: Constantine Bloom MD;  Location: [facility]     Family History   Problem Relation Age of Onset    Hypertension Father     Cancer Father         bladder    Other (Other) Father     Other (Other) Mother     Lipids Mother      Social History:  Social History      Socioeconomic History    Marital status:    Tobacco Use    Smoking status: Never     Passive exposure: Past    Smokeless tobacco: Never   Vaping Use    Vaping status: Never Used   Substance and Sexual Activity    Alcohol use: Not Currently    Drug use: Never   Social History Narrative    ** Merged History Encounter **          Allergies:  Allergies   Allergen Reactions    Otezla [Apremilast] OTHER (SEE COMMENTS)     Depression     Latex RASH     Depends on time exposure      Current Meds:  Current Outpatient Medications   Medication Sig Dispense Refill    halobetasol 0.05 % External Cream Apply topically 2 (two) times daily.      losartan-hydroCHLOROthiazide 50-12.5 MG Oral Tab Take 1 tablet by mouth daily. 90 tablet 3    metFORMIN 500 MG Oral Tab Take 1 tablet (500 mg total) by mouth daily. 30 tablet 12    semaglutide 4 MG/3ML Subcutaneous Solution Pen-injector Inject 1 mg into the skin once a week. 1 each 12    amLODIPine 5 MG Oral Tab Take 1 tablet (5 mg total) by mouth daily. 90 tablet 3    cyclobenzaprine 10 MG Oral Tab Take 1 tablet (10 mg total) by mouth nightly as needed for Muscle spasms. 30 tablet 5    Tapinarof (VTAMA) 1 % External Cream Apply 1 Application. topically daily. To the psoriasis 60 g 2    Econazole Nitrate 1 % External Cream Apply to abdomen twice daily for 3 weeks 85 g 3        ROS:   GENERAL HEALTH: feels well otherwise  SKIN: denies any unusual skin lesions or rashes  RESPIRATORY: denies shortness of breath with exertion  CARDIOVASCULAR: denies chest pain on exertion  GI: denies abdominal pain and denies heartburn  NEURO: denies headaches    PHYSICAL EXAM:   /82   Pulse 73   Temp 97 °F (36.1 °C) (Temporal)   Wt (!) 329 lb (149.2 kg)   SpO2 96%   BMI 47.21 kg/m²   GENERAL HEALTH: well developed, well nourished, in no apparent distress  EYES: sclera anicteric, conjunctiva normal  HEENT: normocephalic; normal pharynx  NECK: supple; no JVD, no LAD  RESPIRATORY: clear to  auscultation bilaterally, no tachypnea  CARDIOVASCULAR: S1, S2 normal, no S3, no S4; no click; no murmur  EXTREMITIES: no cyanosis, clubbing or edema, peripheral pulses intact  PSYCHIATRIC: alert and oriented x 3; affect appropriate  Abd: Soft tender to palpation left upper quadrant left lower quadrant questionable guarding no rebound    ASSESSMENT/ PLAN:     Diagnoses and all orders for this visit:    RLQ abdominal pain  -     CT ABDOMEN+PELVIS(CPT=74176); Future  -     CBC W Differential W Platelet [E]; Future  -     Comp Metabolic Panel (14) [E]; Future    Concerns for this patient range from potential given his body habitus and obesity anything from gallbladder problems versus liver problems versus appendicitis as well.  Will get CBC CMP to evaluate as well as will need a CT stat given his body habitus.  Ideally an ultrasound will be better but he is too obese to have a good picture.  Depending on results we will adjust management.        The patient is to return to office in prn  The patient is to return to office for persistent or worsening signs and symptoms.   The proper use of medication and possible side effects discussed with patient.  An AVS was given to patient.  The patient verbalized understanding, agrees to treatment regimen and all questions were answered.

## 2024-06-20 ENCOUNTER — TELEPHONE (OUTPATIENT)
Dept: FAMILY MEDICINE CLINIC | Facility: CLINIC | Age: 54
End: 2024-06-20

## 2024-06-20 NOTE — TELEPHONE ENCOUNTER
Approval received via from [facility]     Forward to referral dept at Fax#[phone] to update auth in epic

## 2024-09-11 ENCOUNTER — TELEPHONE (OUTPATIENT)
Dept: FAMILY MEDICINE CLINIC | Facility: CLINIC | Age: 54
End: 2024-09-11

## 2024-09-11 NOTE — TELEPHONE ENCOUNTER
Letter mailed to patient reminding him he is due for a follow up per patient reminder.    Xochitl Jimenez RN P [facility] Clinical Staff  DM follow-up in 3 months

## 2024-09-13 ENCOUNTER — TELEPHONE (OUTPATIENT)
Dept: FAMILY MEDICINE CLINIC | Facility: CLINIC | Age: 54
End: 2024-09-13

## 2024-09-13 DIAGNOSIS — E11.9 NEW ONSET TYPE 2 DIABETES MELLITUS (HCC): Primary | ICD-10-CM

## 2024-09-13 NOTE — TELEPHONE ENCOUNTER
Patient requesting order for A1C    He will call for diabetic follow up appt with Dr. Boogie once complete    Please adv  Thank you

## 2024-09-16 ENCOUNTER — LAB ENCOUNTER (OUTPATIENT)
Dept: LAB | Age: 54
End: 2024-09-16
Attending: FAMILY MEDICINE
Payer: COMMERCIAL

## 2024-09-16 DIAGNOSIS — E11.9 NEW ONSET TYPE 2 DIABETES MELLITUS (HCC): ICD-10-CM

## 2024-09-16 LAB
EST. AVERAGE GLUCOSE BLD GHB EST-MCNC: 123 MG/DL (ref 68–126)
HBA1C MFR BLD: 5.9 % (ref ?–5.7)

## 2024-09-16 PROCEDURE — 83036 HEMOGLOBIN GLYCOSYLATED A1C: CPT | Performed by: FAMILY MEDICINE

## 2024-09-19 ENCOUNTER — OFFICE VISIT (OUTPATIENT)
Dept: FAMILY MEDICINE CLINIC | Facility: CLINIC | Age: 54
End: 2024-09-19
Payer: COMMERCIAL

## 2024-09-19 VITALS
TEMPERATURE: 97 F | BODY MASS INDEX: 45.1 KG/M2 | HEART RATE: 54 BPM | DIASTOLIC BLOOD PRESSURE: 86 MMHG | HEIGHT: 70 IN | WEIGHT: 315 LBS | RESPIRATION RATE: 16 BRPM | OXYGEN SATURATION: 99 % | SYSTOLIC BLOOD PRESSURE: 111 MMHG

## 2024-09-19 DIAGNOSIS — E66.01 MORBID OBESITY WITH BMI OF 40.0-44.9, ADULT (HCC): Primary | ICD-10-CM

## 2024-09-19 DIAGNOSIS — E11.9 TYPE 2 DIABETES MELLITUS WITHOUT COMPLICATION, WITHOUT LONG-TERM CURRENT USE OF INSULIN (HCC): ICD-10-CM

## 2024-09-19 DIAGNOSIS — F32.2 MDD (MAJOR DEPRESSIVE DISORDER), SINGLE EPISODE, SEVERE (HCC): ICD-10-CM

## 2024-09-19 PROCEDURE — 99214 OFFICE O/P EST MOD 30 MIN: CPT | Performed by: FAMILY MEDICINE

## 2024-09-19 PROCEDURE — 3079F DIAST BP 80-89 MM HG: CPT | Performed by: FAMILY MEDICINE

## 2024-09-19 PROCEDURE — 3074F SYST BP LT 130 MM HG: CPT | Performed by: FAMILY MEDICINE

## 2024-09-19 PROCEDURE — 3008F BODY MASS INDEX DOCD: CPT | Performed by: FAMILY MEDICINE

## 2024-09-19 PROCEDURE — 3044F HG A1C LEVEL LT 7.0%: CPT | Performed by: FAMILY MEDICINE

## 2024-09-19 RX ORDER — LOSARTAN POTASSIUM AND HYDROCHLOROTHIAZIDE 12.5; 5 MG/1; MG/1
1 TABLET ORAL DAILY
Qty: 90 TABLET | Refills: 3 | Status: SHIPPED | OUTPATIENT
Start: 2024-09-19 | End: 2025-09-14

## 2024-09-19 NOTE — PROGRESS NOTES
Stephen Junior is a 54 year old male.   Chief Complaint   Patient presents with    Follow - Up     Diabetes     Pain     Right sided pain.      HPI:      The patient presents to the office for concerns of diabetic follow up doing well w current regimen, does have some mild discomfort since starting ozempic with his gut, goes to have bm every 3-4 days and gets tender, had ct and showed no gut issue. Otherwise doing well. Last labs done this month and A1C under 6    Past Medical History:    PITA (obstructive sleep apnea)    AHI 65 Supine  non-supine AHI 30    Psoriasis     Past Surgical History:   Procedure Laterality Date    Fluor gid & loclzj ndl/cath spi dx/ther njx N/A 6/9/2015    Procedure: LUMBAR EPIDURAL;  Surgeon: Constantine Bloom MD;  Location: [facility]    Fluor gid & loclzj ndl/cath spi dx/ther njx N/A 6/23/2015    Procedure: LUMBAR EPIDURAL;  Surgeon: Constantine Bloom MD;  Location: [facility]    Injection, w/wo contrast, dx/therapeutic substance, epidural/subarachnoid; lumbar/sacral N/A 6/9/2015    Procedure: LUMBAR EPIDURAL;  Surgeon: Constantine Bloom MD;  Location: [facility]    Injection, w/wo contrast, dx/therapeutic substance, epidural/subarachnoid; lumbar/sacral N/A 6/23/2015    Procedure: LUMBAR EPIDURAL;  Surgeon: Constantine Bloom MD;  Location: [facility]    M-sedaj by  phys perfrmg svc 5+ yr N/A 6/9/2015    Procedure: LUMBAR EPIDURAL;  Surgeon: Constantine Bloom MD;  Location: [facility]     Family History   Problem Relation Age of Onset    Hypertension Father     Cancer Father         bladder    Other (Other) Father     Other (Other) Mother     Lipids Mother      Social History:  Social History     Socioeconomic History    Marital status:    Tobacco Use    Smoking status: Never     Passive exposure: Past    Smokeless tobacco: Never   Vaping Use    Vaping status: Never Used    Substance and Sexual Activity    Alcohol use: Not Currently    Drug use: Never   Social History Narrative    ** Merged History Encounter **          Allergies:  Allergies   Allergen Reactions    Otezla [Apremilast] OTHER (SEE COMMENTS)     Depression     Latex RASH     Depends on time exposure      Current Meds:  Current Outpatient Medications   Medication Sig Dispense Refill    losartan-hydroCHLOROthiazide 50-12.5 MG Oral Tab Take 1 tablet by mouth daily. 90 tablet 3    semaglutide 4 MG/3ML Subcutaneous Solution Pen-injector Inject 1 mg into the skin once a week. 1 each 12    halobetasol 0.05 % External Cream Apply topically 2 (two) times daily.      metFORMIN 500 MG Oral Tab Take 1 tablet (500 mg total) by mouth daily. 30 tablet 12    amLODIPine 5 MG Oral Tab Take 1 tablet (5 mg total) by mouth daily. 90 tablet 3    cyclobenzaprine 10 MG Oral Tab Take 1 tablet (10 mg total) by mouth nightly as needed for Muscle spasms. 30 tablet 5    Tapinarof (VTAMA) 1 % External Cream Apply 1 Application. topically daily. To the psoriasis 60 g 2    Econazole Nitrate 1 % External Cream Apply to abdomen twice daily for 3 weeks 85 g 3        REVIEW OF SYSTEMS:   GENERAL HEALTH: no fever, no appetite change, no weight change  HEENT: no vision changes, no ear symptoms, no hearing changes, no eye symptoms, no nasal symptoms  RESPIRATORY: no cough, no wheezing, no dyspnea, no orthopnea   CARDIOVASCULAR: no chest pain, no chest pressure, no palpitations, no claudication, no edema, no cold extremities, no dyspnea on exertion, no diaphoresis   GI: no nausea, no vomiting, no juandice, no stool changes, no abdominal pain  : no incontinence, no urine changes, no urinary symptoms, no bladder pain  SKIN: no rash, no suspicious mole, no skin lesions,   NEURO: no motor function change, no sensory change, no cognitive changes, no memory changes, no seizures, no headache, no depression and no anxiety  MS: no musculoskeletal symptoms, no joint  pain, no joint stiffness, no joint swelling  Lymphatic:  no cervical lymph node enlargement, no inguinal lymph node enlargement  Endocrine: no cold intolerance, no heat intolerance , no polydipsia ,no polyuria    EXAM:   /86 (BP Location: Left arm, Patient Position: Sitting, Cuff Size: adult)   Pulse 54   Temp 96.8 °F (36 °C)   Resp 16   Ht 5' 10\" (1.778 m)   Wt (!) 326 lb (147.9 kg)   SpO2 99%   BMI 46.78 kg/m²     GENERAL: healthy appearing, well developed, well nourished,in no apparent distress, A&Ox3  Head:  atraumatic, normocephalic  Eyes: PERRLA,EOMI, conjunctiva appeared normal, eyelids appeared normal  ENT: Oropharynx unremarkable   Neck: supple, no thyroid masses, no cervical adenopathy   Respiratory/Chest: CTAB, no wheezing,no rales  CARDIO: RRR , S1,S2 normal, S3 abscent,no murmur  Vascular: no edema  GI/Abdomen: soft, nontender, no mass, BS present  SKIN: warm, no rash  EXTREMITIES.MS: no cyanosis, clubbing or edema, Full ROM of all extremities, no joint swelling or tenderness  Neuro/Psych: MS 5/5 throughout, sensation intact, CN 2-12 grossly intact, DTR were 2+ and symmetric.     ASSESSMENT/ PLAN:     1. Morbid obesity with BMI of 40.0-44.9, adult (HCC)  Losing weight on diabetic regimen, increase metformin for gut purposes to see if can be more regular and help w weight aswell    2. MDD (major depressive disorder), single episode, severe (HCC)  Stable doing well    3. Type 2 diabetes mellitus without complication, without long-term current use of insulin ([facility])  Doing very well, A1C uder 6, no changes on meds expect metformin as above      No follow-ups on file.    The patient is to return to office in 6m  The patient is to return to office for persistent or worsening signs and symptoms.   The proper use of medication and possible side effects discussed with patient.  An AVS was given to patient.  The patient verbalized understanding, agrees to treatment regimen and all questions were  answered.

## 2024-09-22 ENCOUNTER — PATIENT MESSAGE (OUTPATIENT)
Dept: FAMILY MEDICINE CLINIC | Facility: CLINIC | Age: 54
End: 2024-09-22

## 2024-09-23 NOTE — TELEPHONE ENCOUNTER
From: Stephen Junior  To: Christofer Boyle  Sent: 9/22/2024 4:34 PM CDT  Subject: Extra metaformin dose    Looks like the extra dose of metaformin has alleviated the discomfort I was experiencing in my right side. Since that seems to be the case, how does this affect the medication change as you were wanted to up the ozempric dosage and remove the metaformin if I remember correctly.

## 2024-09-23 NOTE — TELEPHONE ENCOUNTER
Since the 2nd metformin dose is helping, continue taking it and will not need to adjust ozempic as your A1C is considered well controlled

## 2025-03-10 ENCOUNTER — PATIENT MESSAGE (OUTPATIENT)
Dept: FAMILY MEDICINE CLINIC | Facility: CLINIC | Age: 55
End: 2025-03-10

## 2025-03-10 DIAGNOSIS — R89.9 ABNORMAL LABORATORY TEST: ICD-10-CM

## 2025-03-10 DIAGNOSIS — E66.01 MORBID OBESITY WITH BMI OF 40.0-44.9, ADULT (HCC): Primary | ICD-10-CM

## 2025-03-10 DIAGNOSIS — E11.9 TYPE 2 DIABETES MELLITUS WITHOUT COMPLICATION, WITHOUT LONG-TERM CURRENT USE OF INSULIN (HCC): ICD-10-CM

## 2025-03-13 ENCOUNTER — OFFICE VISIT (OUTPATIENT)
Dept: FAMILY MEDICINE CLINIC | Facility: CLINIC | Age: 55
End: 2025-03-13
Payer: COMMERCIAL

## 2025-03-13 VITALS
SYSTOLIC BLOOD PRESSURE: 114 MMHG | OXYGEN SATURATION: 96 % | HEART RATE: 65 BPM | HEIGHT: 70 IN | WEIGHT: 304 LBS | BODY MASS INDEX: 43.52 KG/M2 | TEMPERATURE: 97 F | DIASTOLIC BLOOD PRESSURE: 66 MMHG | RESPIRATION RATE: 16 BRPM

## 2025-03-13 DIAGNOSIS — E11.9 TYPE 2 DIABETES MELLITUS WITHOUT COMPLICATION, WITHOUT LONG-TERM CURRENT USE OF INSULIN (HCC): Primary | ICD-10-CM

## 2025-03-13 DIAGNOSIS — E66.01 MORBID OBESITY WITH BMI OF 40.0-44.9, ADULT (HCC): ICD-10-CM

## 2025-03-13 LAB
ALBUMIN SERPL-MCNC: 4.6 G/DL (ref 3.2–4.8)
ALBUMIN/GLOB SERPL: 1.5 {RATIO} (ref 1–2)
ALP LIVER SERPL-CCNC: 62 U/L
ALT SERPL-CCNC: 34 U/L
ANION GAP SERPL CALC-SCNC: 7 MMOL/L (ref 0–18)
AST SERPL-CCNC: 25 U/L (ref ?–34)
BILIRUB SERPL-MCNC: 0.9 MG/DL (ref 0.3–1.2)
BUN BLD-MCNC: 13 MG/DL (ref 9–23)
CALCIUM BLD-MCNC: 9.4 MG/DL (ref 8.7–10.6)
CHLORIDE SERPL-SCNC: 103 MMOL/L (ref 98–112)
CO2 SERPL-SCNC: 30 MMOL/L (ref 21–32)
CREAT BLD-MCNC: 0.95 MG/DL
CREAT UR-SCNC: 100.6 MG/DL
EGFRCR SERPLBLD CKD-EPI 2021: 96 ML/MIN/1.73M2 (ref 60–?)
FASTING STATUS PATIENT QL REPORTED: NO
GLOBULIN PLAS-MCNC: 3 G/DL (ref 2–3.5)
GLUCOSE BLD-MCNC: 95 MG/DL (ref 70–99)
MICROALBUMIN UR-MCNC: <0.3 MG/DL
OSMOLALITY SERPL CALC.SUM OF ELEC: 290 MOSM/KG (ref 275–295)
POTASSIUM SERPL-SCNC: 3.5 MMOL/L (ref 3.5–5.1)
PROT SERPL-MCNC: 7.6 G/DL (ref 5.7–8.2)
SODIUM SERPL-SCNC: 140 MMOL/L (ref 136–145)

## 2025-03-13 PROCEDURE — 3074F SYST BP LT 130 MM HG: CPT | Performed by: FAMILY MEDICINE

## 2025-03-13 PROCEDURE — 3078F DIAST BP <80 MM HG: CPT | Performed by: FAMILY MEDICINE

## 2025-03-13 PROCEDURE — 80053 COMPREHEN METABOLIC PANEL: CPT | Performed by: FAMILY MEDICINE

## 2025-03-13 PROCEDURE — 82570 ASSAY OF URINE CREATININE: CPT | Performed by: FAMILY MEDICINE

## 2025-03-13 PROCEDURE — 3008F BODY MASS INDEX DOCD: CPT | Performed by: FAMILY MEDICINE

## 2025-03-13 PROCEDURE — 82043 UR ALBUMIN QUANTITATIVE: CPT | Performed by: FAMILY MEDICINE

## 2025-03-13 PROCEDURE — G2211 COMPLEX E/M VISIT ADD ON: HCPCS | Performed by: FAMILY MEDICINE

## 2025-03-13 PROCEDURE — 3061F NEG MICROALBUMINURIA REV: CPT | Performed by: FAMILY MEDICINE

## 2025-03-13 PROCEDURE — 83036 HEMOGLOBIN GLYCOSYLATED A1C: CPT | Performed by: FAMILY MEDICINE

## 2025-03-13 PROCEDURE — 99214 OFFICE O/P EST MOD 30 MIN: CPT | Performed by: FAMILY MEDICINE

## 2025-03-13 RX ORDER — AMLODIPINE BESYLATE 5 MG/1
5 TABLET ORAL DAILY
Qty: 90 TABLET | Refills: 3 | Status: SHIPPED | OUTPATIENT
Start: 2025-03-13

## 2025-03-13 RX ORDER — LOSARTAN POTASSIUM AND HYDROCHLOROTHIAZIDE 12.5; 5 MG/1; MG/1
1 TABLET ORAL DAILY
Qty: 90 TABLET | Refills: 3 | Status: SHIPPED | OUTPATIENT
Start: 2025-03-13 | End: 2026-03-08

## 2025-03-13 NOTE — PROGRESS NOTES
Stephen Junior is a 54 year old male.   Chief Complaint   Patient presents with    Follow - Up     DM F/u     HPI:    54-year-old male comes in for follow-up for his diabetes.  Patient states he is taking Ozempic 1 mg weekly.  Patient is also taking metformin 500 mg twice a day but mainly to help with his gastrointestinal mobility.  Patient is currently taking his losartan as well.  Patient denies any chest pain shortness of breath palpitations.  Patient states that he does not have any significant constipation.  Patient states when he feels like he is getting backed up he takes Dulcolax and it works.    Past Medical History:    PITA (obstructive sleep apnea)    AHI 65 Supine  non-supine AHI 30    Psoriasis     Past Surgical History:   Procedure Laterality Date    Fluor gid & loclzj ndl/cath spi dx/ther njx N/A 6/9/2015    Procedure: LUMBAR EPIDURAL;  Surgeon: Constantine Bloom MD;  Location: [facility]    Fluor gid & loclzj ndl/cath spi dx/ther njx N/A 6/23/2015    Procedure: LUMBAR EPIDURAL;  Surgeon: Constantine Bloom MD;  Location: [facility]    Injection, w/wo contrast, dx/therapeutic substance, epidural/subarachnoid; lumbar/sacral N/A 6/9/2015    Procedure: LUMBAR EPIDURAL;  Surgeon: Constantine Bloom MD;  Location: [facility]IN [facility]    Injection, w/wo contrast, dx/therapeutic substance, epidural/subarachnoid; lumbar/sacral N/A 6/23/2015    Procedure: LUMBAR EPIDURAL;  Surgeon: Constantine Bloom MD;  Location: [facility]    M-sedaj by  phys perfrmg svc 5+ yr N/A 6/9/2015    Procedure: LUMBAR EPIDURAL;  Surgeon: Constantine Bloom MD;  Location: [facility]     Family History   Problem Relation Age of Onset    Hypertension Father     Cancer Father         bladder    Other (Other) Father     Other (Other) Mother     Lipids Mother      Social History:  Social History     Socioeconomic History    Marital status:     Tobacco Use    Smoking status: Never     Passive exposure: Past    Smokeless tobacco: Never   Vaping Use    Vaping status: Never Used   Substance and Sexual Activity    Alcohol use: Not Currently    Drug use: Never   Social History Narrative    ** Merged History Encounter **          Allergies:  Allergies[1]   Current Meds:  Current Outpatient Medications   Medication Sig Dispense Refill    losartan-hydroCHLOROthiazide 50-12.5 MG Oral Tab Take 1 tablet by mouth daily. 90 tablet 3    amLODIPine 5 MG Oral Tab Take 1 tablet (5 mg total) by mouth daily. 90 tablet 3    semaglutide 4 MG/3ML Subcutaneous Solution Pen-injector Inject 1 mg into the skin once a week. 1 each 12    metFORMIN 500 MG Oral Tab Take 1 tablet (500 mg total) by mouth 2 (two) times daily with meals. 60 tablet 12    halobetasol 0.05 % External Cream Apply topically 2 (two) times daily.      cyclobenzaprine 10 MG Oral Tab Take 1 tablet (10 mg total) by mouth nightly as needed for Muscle spasms. 30 tablet 5    Tapinarof (VTAMA) 1 % External Cream Apply 1 Application. topically daily. To the psoriasis 60 g 2    Econazole Nitrate 1 % External Cream Apply to abdomen twice daily for 3 weeks 85 g 3        ROS:   GENERAL HEALTH: feels well otherwise  SKIN: denies any unusual skin lesions or rashes  RESPIRATORY: denies shortness of breath with exertion  CARDIOVASCULAR: denies chest pain on exertion  GI: denies abdominal pain and denies heartburn  NEURO: denies headaches    PHYSICAL EXAM:   /66 (BP Location: Right arm, Patient Position: Sitting, Cuff Size: adult)   Pulse 65   Temp 96.9 °F (36.1 °C)   Resp 16   Ht 5' 10\" (1.778 m)   Wt (!) 304 lb (137.9 kg)   SpO2 96%   BMI 43.62 kg/m²   GENERAL HEALTH: well developed, well nourished, in no apparent distress  EYES: sclera anicteric, conjunctiva normal  HEENT: normocephalic; normal pharynx  NECK: supple; no JVD, no LAD  RESPIRATORY: clear to auscultation bilaterally, no  tachypnea  CARDIOVASCULAR: S1, S2 normal, no S3, no S4; no click; no murmur  EXTREMITIES: no cyanosis, clubbing or edema, peripheral pulses intact  PSYCHIATRIC: alert and oriented x 3; affect appropriate      ASSESSMENT/ PLAN:     Diagnoses and all orders for this visit:    Type 2 diabetes mellitus without complication, without long-term current use of insulin ([facility])  -     Microalb/Creat Ratio, Random Urine [E]; Future    Morbid obesity with BMI of 40.0-44.9, adult ([facility])    Other orders  -     losartan-hydroCHLOROthiazide 50-12.5 MG Oral Tab; Take 1 tablet by mouth daily.  -     amLODIPine 5 MG Oral Tab; Take 1 tablet (5 mg total) by mouth daily.  -     semaglutide 4 MG/3ML Subcutaneous Solution Pen-injector; Inject 1 mg into the skin once a week.    Patient seems to doing very well.  Last A1c went from 6.2 down to 5.9.  Advised patient we will recheck labs and if it looks the same or better to consider minimizing the metformin as much as she can acknowledging that issues for intestinal traffic.  Refills given for his blood pressure medications as it is well-controlled as well.    The patient is to return to office in 3 to 6 months  The patient is to return to office for persistent or worsening signs and symptoms.   The proper use of medication and possible side effects discussed with patient.  An AVS was given to patient.  The patient verbalized understanding, agrees to treatment regimen and all questions were answered.        [1]   Allergies  Allergen Reactions    Otezla [Apremilast] OTHER (SEE COMMENTS)     Depression     Latex RASH     Depends on time exposure

## 2025-03-14 LAB
EST. AVERAGE GLUCOSE BLD GHB EST-MCNC: 120 MG/DL (ref 68–126)
HBA1C MFR BLD: 5.8 % (ref ?–5.7)

## 2025-03-21 ENCOUNTER — PATIENT MESSAGE (OUTPATIENT)
Dept: FAMILY MEDICINE CLINIC | Facility: CLINIC | Age: 55
End: 2025-03-21

## 2025-03-28 NOTE — TELEPHONE ENCOUNTER
Closed   3/21/2025  3:28 PM  Close reason: Cannot find matching patient   Note from payer: Cannot find matching patient   Payer: [employer] [facility] Case ID: [id number]    [phone]      Called and spoke with Claudia at [employer] and submitted clinical info via phone for PA. Per Claudia, turnaround time is 3 days and office will be notified via fax  Waiting on response

## 2025-03-31 NOTE — TELEPHONE ENCOUNTER
Fax from [facility] [employer] stating additional info is needed.    Lab result faxed back to [facility]. Waiting on response

## 2025-04-01 NOTE — TELEPHONE ENCOUNTER
Fax from [employer] stating Ozempic is approved until 4/1/26    Pharmacy notified via fax    Patient notified via [facility]

## 2025-06-30 ENCOUNTER — TELEPHONE (OUTPATIENT)
Dept: FAMILY MEDICINE CLINIC | Facility: CLINIC | Age: 55
End: 2025-06-30

## 2025-06-30 NOTE — TELEPHONE ENCOUNTER
[employer] message sent to patient asking for an update on diabetic eye exam. Patient was newly diagnosed with diabetes and a referral to see ophthalmology was placed.

## (undated) NOTE — LETTER
Stephen Junior   [address] [facility] [address]           Dear Stephen Junior     Our records indicate that you are due for a 3 month diabetic follow up with Dr. Boogie. Please call the office to schedule your appointment.   To provide you with the best possible care, please complete these orders at your earliest convenience. If you have recently completed these orders please disregard this letter.     If you have any questions please call the office at [phone].     Thank you,     [facility]

## (undated) NOTE — LETTER
Stephen Junior   [address] [facility] [address]           Dear Stephen Junior     Our records indicate that you have outstanding lab work and or testing that was ordered for you and has not yet been completed:  Lab Frequency Next Occurrence   Comp Metabolic Panel (14) [E] Once 03/22/2024   Hemoglobin A1C [E] Once 03/22/2024   TSH and Free T4 [E] Once 03/22/2024   Microalb/Creat Ratio, Random Urine [E] Once 03/22/2024      To provide you with the best possible care, please complete these orders at your earliest convenience. If you have recently completed these orders please disregard this letter.     If you have any questions please call the office at [phone].     Thank you,     [facility]

## (undated) NOTE — ED AVS SNAPSHOT
[facility] Emergency Department    [place] [address] [place] [address]    Phone:  [phone]    Fax:  [phone]           Mr. Porfirio Seip   MRN: [id number]    Department:  [facility] Emergency Department   Date of Visit at [phone]    Si usted tiene algun problema con cabello sequimiento, por favor llame a nuestro adminstrador de casos al [phone]    Expect to receive an electronic request (by e-mail or text) to complete a self-assessment the day after your visit. [facility] [address] ([address]) [address]  ([address]) [address] [facility] ([address]) [address] [place] [address] 30. (8 PROCEDURE:  ULTRASOUND VENOUS FLOW LEFT LEG     COMPARISON:  [facility] , XR KNEE (1 OR 2 VIEWS), LEFT (CPT=73560), 1/23/2017, 18:16. INDICATIONS:  eval for DVT. Fall.      TECHNIQUE:  Real time, grey scale, and duplex ultrasound was used to evaluate the l Summaries. If you've been to the Emergency Department or your doctor's office, you can view your past visit information in [employer] by going to Visits < Visit Summaries. [employer] questions? Call [phone] for help.   [employer] is NOT to be used for urge

## (undated) NOTE — ED AVS SNAPSHOT
[facility] Emergency Department    [facility] [address] [place] [address]    Phone:  [phone]    Fax:  [phone]           Mr. Thomas Diane   MRN: [id number]    Department:  [facility] Emergency Department   Date of Visit IF THERE IS ANY CHANGE OR WORSENING OF YOUR CONDITION, CALL YOUR PRIMARY CARE PHYSICIAN AT ONCE OR RETURN IMMEDIATELY TO THE EMERGENCY DEPARTMENT.     If you have been prescribed any medication(s), please fill your prescription right away and begin taking t

## (undated) NOTE — MR AVS SNAPSHOT
After Visit Summary   2/3/2018    Sourav Calderon    MRN: [id number]           Visit Information     Date & Time  2/3/2018 10:00 PM Provider  [address] Dept[address]  Phone  [phone]      Allergies as of online. The physician will respond and provide a treatment plan within a few hours.            Treatment for mild   illness or injury that does   not require immediate attention   VIDEO VISITS  Average cost  $35*    e-VISITS  Average cost  $35*      [facility]

## (undated) NOTE — ED AVS SNAPSHOT
Earnest Goel   MRN: [id number]    Department:  [facility] Emergency Department   Date of Visit:  8/23/2018           Disclosure     Insurance plans vary and the physician(s) referred by the ER may not be covered by your plan.  Please contact tell this physician (or your personal doctor if your instructions are to return to your personal doctor) about any new or lasting problems. The primary care or specialist physician will see patients referred from the [facility] Emergency Department.  Mariela Artis